# Patient Record
Sex: FEMALE | URBAN - METROPOLITAN AREA
[De-identification: names, ages, dates, MRNs, and addresses within clinical notes are randomized per-mention and may not be internally consistent; named-entity substitution may affect disease eponyms.]

---

## 2020-09-29 ENCOUNTER — OFFICE VISIT (OUTPATIENT)
Dept: URBAN - METROPOLITAN AREA CLINIC 98 | Facility: CLINIC | Age: 35
End: 2020-09-29

## 2020-10-13 ENCOUNTER — WEB ENCOUNTER (OUTPATIENT)
Dept: URBAN - METROPOLITAN AREA CLINIC 98 | Facility: CLINIC | Age: 35
End: 2020-10-13

## 2020-10-13 ENCOUNTER — OFFICE VISIT (OUTPATIENT)
Dept: URBAN - METROPOLITAN AREA CLINIC 98 | Facility: CLINIC | Age: 35
End: 2020-10-13
Payer: COMMERCIAL

## 2020-10-13 VITALS
HEIGHT: 68 IN | BODY MASS INDEX: 18.67 KG/M2 | HEART RATE: 90 BPM | TEMPERATURE: 97.5 F | DIASTOLIC BLOOD PRESSURE: 85 MMHG | WEIGHT: 123.2 LBS | SYSTOLIC BLOOD PRESSURE: 127 MMHG

## 2020-10-13 DIAGNOSIS — R07.9 CHEST PAIN: ICD-10-CM

## 2020-10-13 DIAGNOSIS — R14.0 BLOATING AND GAS: ICD-10-CM

## 2020-10-13 DIAGNOSIS — R10.13 DYSPEPSIA: ICD-10-CM

## 2020-10-13 PROBLEM — 60728008 BLOATING: Status: ACTIVE | Noted: 2020-10-13

## 2020-10-13 PROCEDURE — 1036F TOBACCO NON-USER: CPT | Performed by: INTERNAL MEDICINE

## 2020-10-13 PROCEDURE — G8483 FLU IMM NO ADMIN DOC REA: HCPCS | Performed by: INTERNAL MEDICINE

## 2020-10-13 PROCEDURE — G8427 DOCREV CUR MEDS BY ELIG CLIN: HCPCS | Performed by: INTERNAL MEDICINE

## 2020-10-13 PROCEDURE — G8420 CALC BMI NORM PARAMETERS: HCPCS | Performed by: INTERNAL MEDICINE

## 2020-10-13 PROCEDURE — 99204 OFFICE O/P NEW MOD 45 MIN: CPT | Performed by: INTERNAL MEDICINE

## 2020-10-13 NOTE — EXAM-PHYSICAL EXAM
Constitutional: well-developed, normal communication ability.   Eyes: Conjunctivae and eyelids appear normal, no scleral icterus.   Nose/nasopharynx, external nose: appear normal, normal appearance of lips.   Neck/thyroid: normal appearance, no masses felt.   Chest: No lesions or deformities. Respiratory: lungs clear to auscultation bilaterally, no rales or wheezing, no crackles   Cardiovascular: No edema, no murmurs or gallops appreciated.   Gastrointestinal: No scars, normoactive bowel sounds, soft, no tenderness, no rebound tenderness, no shifting dullness, no organomegaly.   Lymphatic: Neck without lymphadenopathy.   Musculoskeletal: normal gait and station, no tenderness present.   Skin: no rashes or jaundice. No tenderness on palpation.    Neurologic: Oriented to person, place, time. Normal sensation, short term memory intact.    Psychiatric: Anxious

## 2020-10-13 NOTE — HPI-TODAY'S VISIT:
Ms. Ha is a 36 yo F presenting for new patient appointment for abdominal pain, bloating and gas. About one year ago she began having sharp lower left quadrant pain that happened mostly when exercising. The pain would get better when she drank water or stopped exercising. This pain eventually resolved. She also developed constipation starting around January of 2020. She was having a BM every 2-3 days. This has mostly resolved and now she has a BM daily but feels that her BMs are not completely evacuated. She has tried to drink more water and has started digestive enzymes and that helps her symptoms. She has no blood in the stool and no weight loss. She is eating a high fiber diet and stays away from processed foods.    In September 2020 she went to the ER for fatigue, dizziness, loose stools, and palpitations. She was told it was a stomach virus. She had a normal EKG and blood tests, per patient. She was given a PPI and GI cocktail (bentyl, Nexium, Maalox, simethicone) without immediate improvement. Since then she has had some fullness in her epigatric region with bloating and gas. She gets this sensation after eating. She says that raw vegetables make her symptoms worse. She also has some pain in that area. The pain is crampy. She has it in the mornings most mornings. It is not related to foods. She has chest pain at night and when moving. She has some shortness of breath associated with this. She has no heartburn or acid regurgitation.  She has no NSAID use. She has had intermittent dysphagia to kale for a few days this month that has resolved. When she lays down at night she hears a gurgling sensation in her belly and throat that does not cause pain or discomfort. Sitting up does not resolve the gurgling. She has tried a variety of herbs and supplements, some of which she believes have made her symptoms worse.

## 2020-10-15 LAB
H PYLORI BREATH TEST: NEGATIVE
H. PYLORI BREATH COLLECTION: (no result)

## 2020-12-16 ENCOUNTER — OFFICE VISIT (OUTPATIENT)
Dept: URBAN - METROPOLITAN AREA CLINIC 98 | Facility: CLINIC | Age: 35
End: 2020-12-16

## 2020-12-16 NOTE — HPI-TODAY'S VISIT:
Ms. Ha is a 36 yo F presenting with dyspepsia, gas, bloating. At last visit I recommended low FODMAP diet and h pylori testing which was negative.  10/13/2020 visit: About one year ago she began having sharp lower left quadrant pain that happened mostly when exercising. The pain would get better when she drank water or stopped exercising. This pain eventually resolved. She also developed constipation starting around January of 2020. She was having a BM every 2-3 days. This has mostly resolved and now she has a BM daily but feels that her BMs are not completely evacuated. She has tried to drink more water and has started digestive enzymes and that helps her symptoms. She has no blood in the stool and no weight loss. She is eating a high fiber diet and stays away from processed foods.    In September 2020 she went to the ER for fatigue, dizziness, loose stools, and palpitations. She was told it was a stomach virus. She had a normal EKG and blood tests, per patient. She was given a PPI and GI cocktail (bentyl, Nexium, Maalox, simethicone) without immediate improvement. Since then she has had some fullness in her epigatric region with bloating and gas. She gets this sensation after eating. She says that raw vegetables make her symptoms worse. She also has some pain in that area. The pain is crampy. She has it in the mornings most mornings. It is not related to foods. She has chest pain at night and when moving. She has some shortness of breath associated with this. She has no heartburn or acid regurgitation.  She has no NSAID use. She has had intermittent dysphagia to kale for a few days this month that has resolved. When she lays down at night she hears a gurgling sensation in her belly and throat that does not cause pain or discomfort. Sitting up does not resolve the gurgling. She has tried a variety of herbs and supplements, some of which she believes have made her symptoms worse.

## 2020-12-22 ENCOUNTER — ERX REFILL RESPONSE (OUTPATIENT)
Dept: URBAN - METROPOLITAN AREA CLINIC 98 | Facility: CLINIC | Age: 35
End: 2020-12-22

## 2020-12-22 ENCOUNTER — DASHBOARD ENCOUNTERS (OUTPATIENT)
Age: 35
End: 2020-12-22

## 2020-12-22 ENCOUNTER — OFFICE VISIT (OUTPATIENT)
Dept: URBAN - METROPOLITAN AREA CLINIC 98 | Facility: CLINIC | Age: 35
End: 2020-12-22
Payer: COMMERCIAL

## 2020-12-22 ENCOUNTER — TELEPHONE ENCOUNTER (OUTPATIENT)
Dept: URBAN - METROPOLITAN AREA CLINIC 98 | Facility: CLINIC | Age: 35
End: 2020-12-22

## 2020-12-22 VITALS
DIASTOLIC BLOOD PRESSURE: 73 MMHG | BODY MASS INDEX: 19.16 KG/M2 | WEIGHT: 126.4 LBS | HEART RATE: 84 BPM | SYSTOLIC BLOOD PRESSURE: 118 MMHG | TEMPERATURE: 97.3 F | HEIGHT: 68 IN

## 2020-12-22 DIAGNOSIS — K58.9 IBS (IRRITABLE BOWEL SYNDROME)-DIARRHEA: ICD-10-CM

## 2020-12-22 DIAGNOSIS — R10.9 ABDOMINAL PAIN: ICD-10-CM

## 2020-12-22 DIAGNOSIS — R10.13 DYSPEPSIA: ICD-10-CM

## 2020-12-22 DIAGNOSIS — R14.0 BLOATING AND GAS: ICD-10-CM

## 2020-12-22 PROBLEM — 21522001 ABDOMINAL PAIN: Status: ACTIVE | Noted: 2020-10-13

## 2020-12-22 PROBLEM — 10743008 IRRITABLE BOWEL SYNDROME: Status: ACTIVE | Noted: 2020-12-22

## 2020-12-22 PROBLEM — 763824009 GAS: Status: ACTIVE | Noted: 2020-12-22

## 2020-12-22 PROCEDURE — G8420 CALC BMI NORM PARAMETERS: HCPCS | Performed by: INTERNAL MEDICINE

## 2020-12-22 PROCEDURE — G8483 FLU IMM NO ADMIN DOC REA: HCPCS | Performed by: INTERNAL MEDICINE

## 2020-12-22 PROCEDURE — 99214 OFFICE O/P EST MOD 30 MIN: CPT | Performed by: INTERNAL MEDICINE

## 2020-12-22 PROCEDURE — 1036F TOBACCO NON-USER: CPT | Performed by: INTERNAL MEDICINE

## 2020-12-22 PROCEDURE — 74177 CT ABD & PELVIS W/CONTRAST: CPT | Performed by: INTERNAL MEDICINE

## 2020-12-22 PROCEDURE — G8427 DOCREV CUR MEDS BY ELIG CLIN: HCPCS | Performed by: INTERNAL MEDICINE

## 2020-12-22 RX ORDER — RIFAXIMIN 550 MG/1
TAKE 1 TABLET BY MOUTH THREE TIMES A DAY FOR 14 DAYS TABLET ORAL
Qty: 42 TABLET | Refills: 2 | Status: ACTIVE | COMMUNITY

## 2020-12-22 RX ORDER — RIFAXIMIN 550 MG/1
1 TABLET TABLET ORAL THREE TIMES A DAY
Qty: 42 | Refills: 2 | OUTPATIENT

## 2020-12-22 RX ORDER — SULFAMETHOXAZOLE AND TRIMETHOPRIM 800; 160 MG/1; MG/1
1 TABLET TABLET ORAL TWICE A DAY
Qty: 20 TABLET | Refills: 0 | OUTPATIENT
Start: 2020-12-23 | End: 2021-01-02

## 2020-12-22 RX ORDER — RIFAXIMIN 550 MG/1
TAKE 1 TABLET BY MOUTH THREE TIMES A DAY FOR 14 DAYS TABLET ORAL
Qty: 42 TABLET | Refills: 2 | OUTPATIENT

## 2020-12-22 NOTE — HPI-TODAY'S VISIT:
Ms. Ha is a 34 yo F presenting with dyspepsia, gas, bloating. At last visit I recommended low FODMAP diet and h pylori testing which was negative. She tried foods on the low FODMAP diet and was unable to identify any foods that bring on her symptoms. She also has some lower left quadrant pain that is crampy and intermittent, 3/10. She is unsure what brings on the pain. She is unsure if foods or bowel habits are related. She has no nausea or vomiting. She has no weight loss. She has loose stools a few days per week without blood.  10/13/2020 visit: About one year ago she began having sharp lower left quadrant pain that happened mostly when exercising. The pain would get better when she drank water or stopped exercising. This pain eventually resolved. She also developed constipation starting around January of 2020. She was having a BM every 2-3 days. This has mostly resolved and now she has a BM daily but feels that her BMs are not completely evacuated. She has tried to drink more water and has started digestive enzymes and that helps her symptoms. She has no blood in the stool and no weight loss. She is eating a high fiber diet and stays away from processed foods.    In September 2020 she went to the ER for fatigue, dizziness, loose stools, and palpitations. She was told it was a stomach virus. She had a normal EKG and blood tests, per patient. She was given a PPI and GI cocktail (bentyl, Nexium, Maalox, simethicone) without immediate improvement. Since then she has had some fullness in her epigatric region with bloating and gas. She gets this sensation after eating. She says that raw vegetables make her symptoms worse. She also has some pain in that area. The pain is crampy. She has it in the mornings most mornings. It is not related to foods. She has chest pain at night and when moving. She has some shortness of breath associated with this. She has no heartburn or acid regurgitation.  She has no NSAID use. She has had intermittent dysphagia to kale for a few days this month that has resolved. When she lays down at night she hears a gurgling sensation in her belly and throat that does not cause pain or discomfort. Sitting up does not resolve the gurgling. She has tried a variety of herbs and supplements, some of which she believes have made her symptoms worse.

## 2021-01-07 ENCOUNTER — OFFICE VISIT (OUTPATIENT)
Dept: URBAN - METROPOLITAN AREA CLINIC 98 | Facility: CLINIC | Age: 36
End: 2021-01-07